# Patient Record
Sex: MALE | Race: OTHER | Employment: OTHER | URBAN - METROPOLITAN AREA
[De-identification: names, ages, dates, MRNs, and addresses within clinical notes are randomized per-mention and may not be internally consistent; named-entity substitution may affect disease eponyms.]

---

## 2017-01-09 ENCOUNTER — TELEPHONE (OUTPATIENT)
Dept: INTERNAL MEDICINE CLINIC | Facility: CLINIC | Age: 62
End: 2017-01-09

## 2017-01-09 RX ORDER — CLARITHROMYCIN 500 MG/1
500 TABLET, COATED ORAL 2 TIMES DAILY
Qty: 20 TABLET | Refills: 0 | Status: SHIPPED | OUTPATIENT
Start: 2017-01-09

## 2017-01-09 NOTE — TELEPHONE ENCOUNTER
Still has pain in his ear, which is throbbing, and a lump on ear, he showed you at his last office visit,  Asking if he could get antibiotics ? ??      [facility]----KALIN

## 2017-04-14 ENCOUNTER — TELEPHONE (OUTPATIENT)
Dept: INTERNAL MEDICINE CLINIC | Facility: CLINIC | Age: 62
End: 2017-04-14

## 2017-04-14 DIAGNOSIS — S86.911A KNEE STRAIN, RIGHT, INITIAL ENCOUNTER: Primary | ICD-10-CM

## 2017-04-14 NOTE — TELEPHONE ENCOUNTER
Wanted to know if he could have MELOXICAM prescribed for his painful knee ? He tried one of his friends and it seemed to work for him.     [facility]---KALIN

## 2017-04-14 NOTE — TELEPHONE ENCOUNTER
Having a lot of pain in his right knee, would like a referral for a ORTHOPEDIC.     [facility]---[phone]

## 2017-04-17 RX ORDER — MELOXICAM 15 MG/1
15 TABLET ORAL DAILY
Qty: 30 TABLET | Refills: 2 | Status: SHIPPED | OUTPATIENT
Start: 2017-04-17

## 2017-07-31 RX ORDER — ALPRAZOLAM 0.5 MG/1
0.5 TABLET ORAL 2 TIMES DAILY PRN
Qty: 40 TABLET | Refills: 1 | Status: SHIPPED
Start: 2017-07-31

## 2018-10-15 ENCOUNTER — TELEPHONE (OUTPATIENT)
Dept: GASTROENTEROLOGY | Facility: CLINIC | Age: 63
End: 2018-10-15

## 2018-10-15 NOTE — TELEPHONE ENCOUNTER
----- Message from Jasmeet Pennington RN sent at 11/23/2015  8:11 AM CST -----  Regarding: CLN recall  3 year CLN recall, per Dr. Yana Jenkins; CLN done 11/13/15

## (undated) NOTE — LETTER
[facility], [facility]  [address] [facility], [place], [address] [place] [address]  Dept: [phone]    10/15/2018        Imani Santos        [address] [place] [id number]             Dear Shamika Deng record